# Patient Record
Sex: MALE | Race: WHITE | NOT HISPANIC OR LATINO | Employment: PART TIME | URBAN - METROPOLITAN AREA
[De-identification: names, ages, dates, MRNs, and addresses within clinical notes are randomized per-mention and may not be internally consistent; named-entity substitution may affect disease eponyms.]

---

## 2022-09-12 ENCOUNTER — HOSPITAL ENCOUNTER (EMERGENCY)
Facility: HOSPITAL | Age: 27
Discharge: HOME | End: 2022-09-12
Attending: EMERGENCY MEDICINE | Admitting: EMERGENCY MEDICINE

## 2022-09-12 VITALS
HEART RATE: 69 BPM | DIASTOLIC BLOOD PRESSURE: 80 MMHG | RESPIRATION RATE: 16 BRPM | SYSTOLIC BLOOD PRESSURE: 131 MMHG | OXYGEN SATURATION: 99 % | TEMPERATURE: 97.4 F

## 2022-09-12 DIAGNOSIS — S16.1XXA POSTEROLATERAL CERVICAL MUSCLE STRAIN, INITIAL ENCOUNTER: Primary | ICD-10-CM

## 2022-09-12 PROCEDURE — 99282 EMERGENCY DEPT VISIT SF MDM: CPT

## 2022-09-12 RX ORDER — CYCLOBENZAPRINE HCL 10 MG
10 TABLET ORAL 3 TIMES DAILY PRN
Qty: 15 TABLET | Refills: 0 | Status: SHIPPED | OUTPATIENT
Start: 2022-09-12

## 2022-09-12 NOTE — ED PROVIDER NOTES
EMERGENCY DEPARTMENT ENCOUNTER    Room Number: B01/01  Date Seen: 9/12/2022  Time Seen: 17:36 EDT  PCP: System, Provider Not In    Historian: Patient      HISTORY OF PRESENT ILLNESS    Chief Complaint: Neck pain    Context: Chris Villanueva is a 27 y.o. male with no significant pertinent PMHx who presents to the ED with c/o left-sided neck pain that began earlier today when he was lifting at the gym.  He states he was doing overhead presses using a slightly heavier weight than normal when he felt a sudden twinge on the left side of his neck.  He reports intermittent sharp pains mostly with any range of motion.  He denies any paresthesias, weakness, or vision changes.        MEDICAL RECORD REVIEW    Reviewed in Epic       PAST MEDICAL HISTORY    Active Ambulatory Problems     Diagnosis Date Noted   • No Active Ambulatory Problems     Resolved Ambulatory Problems     Diagnosis Date Noted   • No Resolved Ambulatory Problems     No Additional Past Medical History         PAST SURGICAL HISTORY    No past surgical history on file.      FAMILY HISTORY    No family history on file.      SOCIAL HISTORY    Social History     Socioeconomic History   • Marital status: Single         ALLERGIES    Patient has no allergy information on record.      REVIEW OF SYSTEMS    Review of Systems   Eyes: Negative for photophobia and visual disturbance.   Cardiovascular: Negative for chest pain.   Gastrointestinal: Negative for nausea and vomiting.   Musculoskeletal: Positive for myalgias and neck pain.   Skin: Negative.    Neurological: Negative for dizziness, weakness, light-headedness and numbness.       All systems reviewed and negative except those discussed in HPI.      PHYSICAL EXAM    ED Triage Vitals [09/12/22 1646]   Temp Heart Rate Resp BP SpO2   97.4 °F (36.3 °C) 72 18 -- 99 %      Temp src Heart Rate Source Patient Position BP Location FiO2 (%)   Tympanic Monitor -- -- --       I have reviewed the triage vital signs and nursing  notes.    Constitutional: Well appearing, not distressed  Head: Atraumatic, normocephalic  Neck: Left lower cervical paraspinal and trapezius tenderness and spasming.  No midline cervical tenderness.  Increased pain with all range of motion but most significant with rotation of the head to the left.  No anterior neck swelling or tenderness.  Eyes: No scleral icterus, no scleral injection  ENT: Nares patent  CV: Regular rate, regular rhythm, distal pulses symmetric  Respiratory: No distress, CTAB  GI: Abdomen soft  Musculoskeletal:  strength normal and equal bilaterally, sensation intact no deformity, soft compartments, no edema  Skin: Warm, dry, no rash  Neuro: A&Ox4, moves all extremities, follows commands, no focal deficits  Psych: Normal mood        MEDICATIONS GIVEN IN ER    Medications - No data to display      PROGRESS, CONSULTS, and MEDICAL DECISION MAKING      Pt presents with sudden onset sharp pain to left posterolateral neck while weight lifting. Normal neurological exam. No concern for arterial dissection. Normal strength upper extremities. No fall/trauma. Will give muscle relaxer for home. Discussed follow up and return precautions.      Discussed plan for discharge, as there is no emergent indication for admission. Pt/family is agreeable and understands need for follow up and repeat testing.  Pt is aware that discharge does not mean that nothing is wrong but it indicates no emergency is present that requires admission and they must continue care with follow-up as given below or physician of their choice. Risks, benefits, alternatives discussed with patient.  Pt consents to treatment and agrees to follow up with PMD as soon as possible for further care and any additional prescriptions.     Patient/Family voiced understanding of above instructions.  Patient discharged in stable condition.      DIAGNOSIS  Final diagnoses:   Posterolateral cervical muscle strain, initial encounter       DISPOSITION  ED  Disposition     ED Disposition   Discharge    Condition   Stable    Comment   --             FOLLOW UP  Your primary care provider    Schedule an appointment as soon as possible for a visit       [facility] Emergency Department  [address]  [facility] [address] [phone]    As needed, If symptoms worsen      DISCHARGE RX     Medication List      New Prescriptions    cyclobenzaprine 10 MG tablet  Commonly known as: FLEXERIL  Take 1 tablet by mouth 3 (Three) Times a Day As Needed for Muscle Spasms.           Where to Get Your Medications      These medications were sent to [facility] "[facility] #[id number] - [place], [place] - [address] [place] [address] AT [facility] - [phone]  - [phone] FX  [address] [facility], [place] [address]    Phone: [phone]   · cyclobenzaprine 10 MG tablet           Patient was placed in face mask in first look. Patient was wearing facemask when I entered the room and throughout our encounter. I wore full protective equipment throughout this patient encounter including a face mask, and gloves. Hand hygiene was performed before donning protective equipment and after removal when leaving the room.    Dictated utilizing Dragon dictation.      Note Disclaimer: At [facility], we believe that sharing information builds trust and better relationships. You are receiving this note because you recently visited [facility]. It is possible you will see health information before a provider has talked with you about it. This kind of information can be easy to misunderstand. To help you fully understand what it means for your health, we urge you to discuss this note with your provider.           Crissy Ponce PA  09/12/22 9081

## 2022-09-12 NOTE — ED PROVIDER NOTES
MD ATTESTATION NOTE    The TAYLER and I have discussed this patient's history, physical exam, and treatment plan.  I have reviewed the documentation and personally had a face to face interaction with the patient. I affirm the documentation and agree with the treatment and plan.  The attached note describes my personal findings.    I provided a substantive portion of the care of this patient. I personally performed the physical exam, in its entirety.    Chris Villanueva is a 27 y.o. male who presents to the ED c/o left neck pain.  He reports that he was lifting this morning around 11 AM and he felt a sudden burning and pulling in his left trapezius.  He states that he tried to lift again and it caused the same pain.  He denies any weakness or numbness.  He denies any chest pain or shortness of breath.  He has never had similar episodes in the past.  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      On exam:  GENERAL: Awake, alert, no acute distress  SKIN: Warm, dry  HENT: Normocephalic, atraumatic  EYES: no scleral icterus  CV: regular rhythm, regular rate  RESPIRATORY: normal effort, lungs clear  ABDOMEN: soft, nontender, nondistended  MUSCULOSKELETAL: no deformity  NEURO: alert, moves all extremities, follows commands    Labs  No results found for this or any previous visit (from the past 24 hour(s)).    Radiology  No Radiology Exams Resulted Within Past 24 Hours    Medical Decision Making:       Procedures:  Procedures    The patient clinically has a trapezius muscle strain.  He needs no imaging.  He has no cervical spine pain.  Plan conservative treatment with anti-inflammatories, ice.    PPE: The patient wore a mask and I wore an N95 mask throughout the entire patient encounter.      The patient qualifies to receive the vaccine, but they have not yet received it.    Diagnosis  Final diagnoses:   Posterolateral cervical muscle strain, initial encounter       Note  Disclaimer: At [facility], we believe that sharing information builds trust and better relationships. You are receiving this note because you recently visited [facility]. It is possible you will see health information before a provider has talked with you about it. This kind of information can be easy to misunderstand. To help you fully understand what it means for your health, we urge you to discuss this note with your provider.     Garry Luciano MD  09/12/22 0307

## 2022-09-12 NOTE — ED TRIAGE NOTES
Pt top ED from home. Pt was at the gym lifting and felt something pull on his neck on the left side. Pain level is an 8 when pt tried to turn his head.     Pt wearing proper PPE along with ED staff.